# Patient Record
(demographics unavailable — no encounter records)

---

## 2025-02-05 NOTE — ASSESSMENT
[FreeTextEntry1] : 1. vertigo -MRI brain and IAC -vng -vemp  2. b snhl -audio exam: hearing wnl sloping to mild snhl, then rising to hearing wnl AU; type a tymps AU. stable compared to audio exam 1/2024. -results reviewed with patient  3. auditory processing disorder, cognitive changes -contact info for Dr. Carol Salinas, Dr Presley Cole, Dr. Lexus Camacho provided  RTC to discuss results

## 2025-02-05 NOTE — REVIEW OF SYSTEMS
I am assuming infection is the main concern.  I would have Wanda get her flu shot and Covid booster.  Then I would have kids all tested for Covid prior to her visit.  IF all test negative and they are feeling well, then it should be OK. If anyone has any possible symptoms, then I would cancel.   [As Noted in HPI] : as noted in HPI [Negative] : Heme/Lymph

## 2025-02-05 NOTE — HISTORY OF PRESENT ILLNESS
[de-identified] : 6 month follow up visit for this 33 y/o F with h/o Lyme disease since 2006 and b snhl. reports that she is still having issues with auditory processing. States that when she is taking a nap, loud noises can trigger vertigo sensation. States that she does not wear hearing aids when she naps. She has been using earplugs to prevent symptoms from occurring. Has h/o dysautonomia and orthostatic hypotension. Notes her cognitive function has been declining lately.

## 2025-02-05 NOTE — HISTORY OF PRESENT ILLNESS
[de-identified] : 6 month follow up visit for this 35 y/o F with h/o Lyme disease since 2006 and b snhl. reports that she is still having issues with auditory processing. States that when she is taking a nap, loud noises can trigger vertigo sensation. States that she does not wear hearing aids when she naps. She has been using earplugs to prevent symptoms from occurring. Has h/o dysautonomia and orthostatic hypotension. Notes her cognitive function has been declining lately.

## 2025-02-05 NOTE — PHYSICAL EXAM
[Midline] : trachea located in midline position [Normal] : assessment of respiratory effort is normal [] : [place]-Hallpike test is negative

## 2025-02-05 NOTE — PHYSICAL EXAM
[Midline] : trachea located in midline position [Normal] : assessment of respiratory effort is normal [] : [place]-Hallpike test is negative

## 2025-03-28 NOTE — HISTORY OF PRESENT ILLNESS
[de-identified] : 6 week follow up visit for this 33 y/o F with h/o Lyme disease since 2006 and b snhl for vertigo. reports that she is still having issues with auditory processing. Loud noises can trigger vertigo sensation. Has h/o dysautonomia and orthostatic hypotension. In terms of Lyme disease, reportedly based on follow up another course of antibiotics were recommended that she deferred. Here for follow up and to review VNG, VEMP and MRI.

## 2025-03-28 NOTE — HISTORY OF PRESENT ILLNESS
[de-identified] : 6 week follow up visit for this 35 y/o F with h/o Lyme disease since 2006 and b snhl for vertigo. reports that she is still having issues with auditory processing. Loud noises can trigger vertigo sensation. Has h/o dysautonomia and orthostatic hypotension. In terms of Lyme disease, reportedly based on follow up another course of antibiotics were recommended that she deferred. Here for follow up and to review VNG, VEMP and MRI.

## 2025-03-28 NOTE — DATA REVIEWED
Complete [de-identified] : l 20% uvw, some upbeating nystagmus; vemp nl b reviewed with pt [de-identified] : mri brain & iac (3/27/25): No evidence for a vestibular schwannoma. High riding left superior semicircular canal. If clinically warranted, CT scan of the temporal bones could be performed to confirm this impression. images and report reviewed with pt.

## 2025-03-28 NOTE — DATA REVIEWED
[de-identified] : l 20% uvw, some upbeating nystagmus; vemp nl b reviewed with pt [de-identified] : mri brain & iac (3/27/25): No evidence for a vestibular schwannoma. High riding left superior semicircular canal. If clinically warranted, CT scan of the temporal bones could be performed to confirm this impression. images and report reviewed with pt.

## 2025-03-28 NOTE — ASSESSMENT
[FreeTextEntry1] : -vng: left inner ear borderline normal (RVR 20%) -vemp normal -mri brain & iac: no evidence for a vestibular schwannoma. High riding left superior semicircular canal. -rec vestibular therapy -increase hydration, pcp for hypotension mgmt -return to ID specialist Dr Oseguera for lyme; she explained she was not sure lyme had been treated completely rtc 3 mo

## 2025-04-24 NOTE — HISTORY OF PRESENT ILLNESS
[FreeTextEntry1] : Reason for consult: Cognitive decline   HPI: DEISY MONCADA is a 34-year-old woman referred by Dr Cardenas  2012: [place] [facility] [place] of neuropathy (burning pain affecting trunk and arm, numbness from hip down) and brain fog.  Was found to have + Lyme. Diagnosed with fibromyalgia and chronic fatigue symptoms. She received treatment for Lyme with antibiotics for 2 years. Improvement of neuropathy symptoms but with persistent brain fog. Feels symptoms stabilized.  [id number]: Went back to school and was functional despite some symptoms. 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 Colleagues noticed symptoms.  2019: Worsening of cognitive symptoms.  Mom reports that recently she has to remind her of stuff on her schedule and appointments.   1/2024: Saw Dr Cardenas for difficulty with auditory processing. Found to have bilateral sensorineural hearing loss requiring hearing aid. She has difficulty processing words and distinguishing what people are saying, worse over the past year. 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  Normally conversant.  Follows commands, names, and repeats.  Good attention.   PERRL, VFF, EOMI, no nystagmus, face symmetric, TUP at midline.   Motor:                                                R:                               L: Del                                          5                               5 Bi                                            5                               5 Tri                                           5                               5 Wrist Extensors                      5                               5 Finger abductors                    5                               5                                         5                               5   HF                                           5                               5 KE                                           5                               5 KF                                           5                               5 DF                                           5                               5 PF                                           5                               5   Tone                                R                               L UE                                   0                               0 LE                                   0                                0   Sensory                 RUE/ LUE                  RLE/ LLE     LT                           +/ +                                  +/+ Vib                          +/+                                   +/+ JPS                        +/ +                                  +/ +                   PP                          +/+                                   +/+ Temp                      +/+                                   +/+   Reflexes:                          R                             L                            Biceps              2                             2 BR                    2                             2 Triceps             2                             2 Pat                   2                             2 AJ                    2                             2   TOES              F                            F     Coordination:                        R                             L                       FTN                0                            0 STEPHANY               0                            0 HTS                0                            0   Other                                                                              Gait: Normal with tip toes, tandem and heels   MR brain w/wo with IAC 3/2025: Normal ([facility])      AP:34 with hx of Lyme disease, currently with mainly cognitive changes since 2006 that she feels is affecting her job performance. MR brain unremarkable. Plan to rule out reversible causes and refer to Neurocognitive team for further evaluation. Doubt a neuroimmune etiology.  all questions answered, education provided, management discussed at length.  -Blood work including AE panel and Rheumatological panel -AEEG -Neuropsych eval- has an upcoming appointment - Continue follow up with ID - Referral to neurocognitive specialist- Dr Baez

## 2025-04-24 NOTE — END OF VISIT
[] : Fellow [FreeTextEntry3] : agree with note as above. she has had cognitive symptoms for many years of unclear etiology. neurological exam is unrevealing. I recommended neuropsych testing which she is doing, as well as a referral to a cognitive neurology specialist. we will check amb EEG and autoimmune blood work but there is low suspicion for a neuroimmune etiology at this time. [Time Spent: ___ minutes] : I have spent [unfilled] minutes of time on the encounter which excludes teaching and separately reported services.

## 2025-04-24 NOTE — HISTORY OF PRESENT ILLNESS
[FreeTextEntry1] : Reason for consult: Cognitive decline   HPI: DEISY MONCADA is a 34-year-old woman referred by Dr Cardenas  2012: [place] [facility] [place] multiple symptoms of neuropathy (burning pain affecting trunk and arm, numbness from hip down) and brain fog.  Was found to have + Lyme. Diagnosed with fibromyalgia and chronic fatigue symptoms. She received treatment for Lyme with antibiotics for 2 years. Improvement of neuropathy symptoms but with persistent brain fog. Feels symptoms stabilized.  [id number]: Went back to school and was functional despite some symptoms. 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 Colleagues noticed symptoms.  2019: Worsening of cognitive symptoms.  Mom reports that recently she has to remind her of stuff on her schedule and appointments.   1/2024: Saw Dr Cardenas for difficulty with auditory processing. Found to have bilateral sensorineural hearing loss requiring hearing aid. She has difficulty processing words and distinguishing what people are saying, worse over the past year. 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  Normally conversant.  Follows commands, names, and repeats.  Good attention.   PERRL, VFF, EOMI, no nystagmus, face symmetric, TUP at midline.   Motor:                                                R:                               L: Del                                          5                               5 Bi                                            5                               5 Tri                                           5                               5 Wrist Extensors                      5                               5 Finger abductors                    5                               5                                         5                               5   HF                                           5                               5 KE                                           5                               5 KF                                           5                               5 DF                                           5                               5 PF                                           5                               5   Tone                                R                               L UE                                   0                               0 LE                                   0                                0   Sensory                 RUE/ LUE                  RLE/ LLE     LT                           +/ +                                  +/+ Vib                          +/+                                   +/+ JPS                        +/ +                                  +/ +                   PP                          +/+                                   +/+ Temp                      +/+                                   +/+   Reflexes:                          R                             L                            Biceps              2                             2 BR                    2                             2 Triceps             2                             2 Pat                   2                             2 AJ                    2                             2   TOES              F                            F     Coordination:                        R                             L                       FTN                0                            0 STEPHANY               0                            0 HTS                0                            0   Other                                                                              Gait: Normal with tip toes, tandem and heels   MR brain w/wo with IAC 3/2025: Normal ([facility])      AP:34 with hx of Lyme disease, currently with mainly cognitive changes since 2006 that she feels is affecting her job performance. MR brain unremarkable. Plan to rule out reversible causes and refer to Neurocognitive team for further evaluation. Doubt a neuroimmune etiology.  all questions answered, education provided, management discussed at length.  -Blood work including AE panel and Rheumatological panel -AEEG -Neuropsych eval- has an upcoming appointment - Continue follow up with ID - Referral to neurocognitive specialist- Dr Baez

## 2025-06-11 NOTE — HISTORY OF PRESENT ILLNESS
[de-identified] : 11 week F/U visit for this 34yo F with h/o Lyme disease, b snhl, vertigo, dysautonomia, & orthostatic hypotension. Rec'd V rehab, increase hydration, & PCP for hypotension mgmt. She found it impossible to work- overwhelmed burned out and slow - on leave since 3.31; diagnosed with autism which she feels makes sense to her and she is getting therapies for it. She is wearing her hearing aids- has snhl and had itchiness of the ears which resolved with vitamin E cream.

## 2025-06-11 NOTE — HISTORY OF PRESENT ILLNESS
[de-identified] : 11 week F/U visit for this 36yo F with h/o Lyme disease, b snhl, vertigo, dysautonomia, & orthostatic hypotension. Rec'd V rehab, increase hydration, & PCP for hypotension mgmt. She found it impossible to work- overwhelmed burned out and slow - on leave since 3.31; diagnosed with autism which she feels makes sense to her and she is getting therapies for it. She is wearing her hearing aids- has snhl and had itchiness of the ears which resolved with vitamin E cream.

## 2025-06-11 NOTE — ASSESSMENT
[FreeTextEntry1] : snhl\autism stable getting the therapies she needs and is doing well she notes her ha help her in loud areas stay focused rtc 3mo with fransico

## 2025-06-11 NOTE — PHYSICAL EXAM
[Normal] : assessment of respiratory effort is normal [FreeTextEntry2] : VII intact b [de-identified] : gait steady

## 2025-06-11 NOTE — HISTORY OF PRESENT ILLNESS
[de-identified] : 11 week F/U visit for this 34yo F with h/o Lyme disease, b snhl, vertigo, dysautonomia, & orthostatic hypotension. Rec'd V rehab, increase hydration, & PCP for hypotension mgmt. She found it impossible to work- overwhelmed burned out and slow - on leave since 3.31; diagnosed with autism which she feels makes sense to her and she is getting therapies for it. She is wearing her hearing aids- has snhl and had itchiness of the ears which resolved with vitamin E cream.

## 2025-06-11 NOTE — PHYSICAL EXAM
[Normal] : assessment of respiratory effort is normal [FreeTextEntry2] : VII intact b [de-identified] : gait steady

## 2025-06-11 NOTE — PHYSICAL EXAM
[Normal] : assessment of respiratory effort is normal [FreeTextEntry2] : VII intact b [de-identified] : gait steady